# Patient Record
Sex: FEMALE | Race: BLACK OR AFRICAN AMERICAN | NOT HISPANIC OR LATINO
[De-identification: names, ages, dates, MRNs, and addresses within clinical notes are randomized per-mention and may not be internally consistent; named-entity substitution may affect disease eponyms.]

---

## 2019-02-09 ENCOUNTER — TRANSCRIPTION ENCOUNTER (OUTPATIENT)
Age: 54
End: 2019-02-09

## 2019-11-27 ENCOUNTER — APPOINTMENT (OUTPATIENT)
Dept: SURGICAL ONCOLOGY | Facility: CLINIC | Age: 54
End: 2019-11-27
Payer: COMMERCIAL

## 2019-11-27 VITALS
BODY MASS INDEX: 25.43 KG/M2 | DIASTOLIC BLOOD PRESSURE: 81 MMHG | OXYGEN SATURATION: 100 % | SYSTOLIC BLOOD PRESSURE: 128 MMHG | WEIGHT: 162 LBS | HEIGHT: 67 IN | HEART RATE: 65 BPM

## 2019-11-27 DIAGNOSIS — R92.8 OTHER ABNORMAL AND INCONCLUSIVE FINDINGS ON DIAGNOSTIC IMAGING OF BREAST: ICD-10-CM

## 2019-11-27 PROCEDURE — 36415 COLL VENOUS BLD VENIPUNCTURE: CPT

## 2019-11-27 PROCEDURE — 99245 OFF/OP CONSLTJ NEW/EST HI 55: CPT

## 2019-11-27 NOTE — PHYSICAL EXAM
[Normal] : supple, no neck mass and thyroid not enlarged [Normal Supraclavicular Lymph Nodes] : normal supraclavicular lymph nodes [Normal Axillary Lymph Nodes] : normal axillary lymph nodes [Normal] : oriented to person, place and time, with appropriate affect [de-identified] : No dominant mass or nipple discharge bilaterally.  [FreeTextEntry1] : Deferred

## 2019-11-27 NOTE — HISTORY OF PRESENT ILLNESS
[de-identified] : Ms. Medina is a 54 year old female who presents today for an initial consultation.  She  was referred by Dr. Tracy Castro\par \par Rosanna has been experiencing some occasional right breast tenderness and prompting her mammo Oct 2019.  She was recalled for targeted imaging for a distortion in the right breast 11:00 however no sonographic correlate was noted (Birads 4).  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  She denies palpable breast masses, nipple discharge, skin changes, inversion. Denies constitutional symptoms.  \par  \par Family history of breast cancer in her neice in her 30s who is also BRCA +.  Denies family history of ovarian cancer.\par \par Menarche: 12 y.o.\par \par Age at first pregnancy: 17 y.o.\par \par Internist: Tracy Castro

## 2019-11-27 NOTE — ASSESSMENT
[FreeTextEntry1] : Impression:\par Newly diagnosed Right breast DCIS/LCIS/radial scar 11:00 \par Tumor markers pending\par +FH in niece who is BRCA+\par \par Plan:\par MRI breast now to evaluate for possible multi-focality\par Right breast jeffery- localized lumpectomy\par Genetic testing today.  \par

## 2019-11-27 NOTE — CONSULT LETTER
[Dear  ___] : Dear  [unfilled], [Consult Letter:] : I had the pleasure of evaluating your patient, [unfilled]. [Please see my note below.] : Please see my note below. [Consult Closing:] : Thank you very much for allowing me to participate in the care of this patient.  If you have any questions, please do not hesitate to contact me. [Sincerely,] : Sincerely, [FreeTextEntry1] : I will keep you informed of the MRI and BRCA results and my subsequent plans. [FreeTextEntry3] : Zach Valencia MD FACS\par Chief of Surgical Oncology\par \par

## 2019-12-12 ENCOUNTER — FORM ENCOUNTER (OUTPATIENT)
Age: 54
End: 2019-12-12

## 2019-12-13 ENCOUNTER — APPOINTMENT (OUTPATIENT)
Dept: MRI IMAGING | Facility: IMAGING CENTER | Age: 54
End: 2019-12-13
Payer: COMMERCIAL

## 2019-12-13 ENCOUNTER — OUTPATIENT (OUTPATIENT)
Dept: OUTPATIENT SERVICES | Facility: HOSPITAL | Age: 54
LOS: 1 days | End: 2019-12-13
Payer: COMMERCIAL

## 2019-12-13 DIAGNOSIS — D05.10 INTRADUCTAL CARCINOMA IN SITU OF UNSPECIFIED BREAST: ICD-10-CM

## 2019-12-13 PROCEDURE — C8908: CPT

## 2019-12-13 PROCEDURE — 77049 MRI BREAST C-+ W/CAD BI: CPT | Mod: 26

## 2019-12-13 PROCEDURE — C8937: CPT

## 2019-12-13 PROCEDURE — A9585: CPT

## 2019-12-16 ENCOUNTER — CHART COPY (OUTPATIENT)
Age: 54
End: 2019-12-16

## 2019-12-22 ENCOUNTER — FORM ENCOUNTER (OUTPATIENT)
Age: 54
End: 2019-12-22

## 2019-12-23 ENCOUNTER — APPOINTMENT (OUTPATIENT)
Dept: MRI IMAGING | Facility: IMAGING CENTER | Age: 54
End: 2019-12-23
Payer: COMMERCIAL

## 2019-12-23 ENCOUNTER — RESULT REVIEW (OUTPATIENT)
Age: 54
End: 2019-12-23

## 2019-12-23 ENCOUNTER — OUTPATIENT (OUTPATIENT)
Dept: OUTPATIENT SERVICES | Facility: HOSPITAL | Age: 54
LOS: 1 days | End: 2019-12-23
Payer: COMMERCIAL

## 2019-12-23 DIAGNOSIS — D05.10 INTRADUCTAL CARCINOMA IN SITU OF UNSPECIFIED BREAST: ICD-10-CM

## 2019-12-23 PROCEDURE — A9585: CPT

## 2019-12-23 PROCEDURE — 77065 DX MAMMO INCL CAD UNI: CPT

## 2019-12-23 PROCEDURE — 88305 TISSUE EXAM BY PATHOLOGIST: CPT | Mod: 26

## 2019-12-23 PROCEDURE — 19086 BX BREAST ADD LESION MR IMAG: CPT

## 2019-12-23 PROCEDURE — 19085 BX BREAST 1ST LESION MR IMAG: CPT

## 2019-12-23 PROCEDURE — 77065 DX MAMMO INCL CAD UNI: CPT | Mod: 26,RT

## 2019-12-23 PROCEDURE — 19086 BX BREAST ADD LESION MR IMAG: CPT | Mod: RT

## 2019-12-23 PROCEDURE — 88305 TISSUE EXAM BY PATHOLOGIST: CPT

## 2019-12-23 PROCEDURE — 19085 BX BREAST 1ST LESION MR IMAG: CPT | Mod: RT

## 2019-12-30 ENCOUNTER — CHART COPY (OUTPATIENT)
Age: 54
End: 2019-12-30

## 2019-12-30 ENCOUNTER — RESULT REVIEW (OUTPATIENT)
Age: 54
End: 2019-12-30

## 2019-12-30 ENCOUNTER — OUTPATIENT (OUTPATIENT)
Dept: OUTPATIENT SERVICES | Facility: HOSPITAL | Age: 54
LOS: 1 days | End: 2019-12-30
Payer: COMMERCIAL

## 2019-12-30 DIAGNOSIS — C50.919 MALIGNANT NEOPLASM OF UNSPECIFIED SITE OF UNSPECIFIED FEMALE BREAST: ICD-10-CM

## 2019-12-30 PROCEDURE — 88321 CONSLTJ&REPRT SLD PREP ELSWR: CPT

## 2020-01-05 PROBLEM — Z13.71 BRCA NEGATIVE: Status: ACTIVE | Noted: 2020-01-05

## 2020-01-06 ENCOUNTER — APPOINTMENT (OUTPATIENT)
Dept: SURGICAL ONCOLOGY | Facility: CLINIC | Age: 55
End: 2020-01-06
Payer: COMMERCIAL

## 2020-01-06 VITALS
OXYGEN SATURATION: 99 % | HEART RATE: 64 BPM | RESPIRATION RATE: 16 BRPM | SYSTOLIC BLOOD PRESSURE: 137 MMHG | HEIGHT: 67 IN | WEIGHT: 162 LBS | DIASTOLIC BLOOD PRESSURE: 89 MMHG | BODY MASS INDEX: 25.43 KG/M2

## 2020-01-06 DIAGNOSIS — Z13.71 ENCOUNTER FOR NONPROCREATIVE SCREENING FOR GENETIC DISEASE CARRIER STATUS: ICD-10-CM

## 2020-01-06 PROCEDURE — 99214 OFFICE O/P EST MOD 30 MIN: CPT

## 2020-01-06 NOTE — CONSULT LETTER
[Dear  ___] : Dear  [unfilled], [Courtesy Letter:] : I had the pleasure of seeing your patient, [unfilled], in my office today. [Please see my note below.] : Please see my note below. [Consult Closing:] : Thank you very much for allowing me to participate in the care of this patient.  If you have any questions, please do not hesitate to contact me. [Sincerely,] : Sincerely, [FreeTextEntry1] : I will keep you informed of the final pathology of the surgery. [FreeTextEntry3] : Zach Valencia MD FACS\par Chief of Surgical Oncology\par \par

## 2020-01-06 NOTE — ASSESSMENT
[FreeTextEntry1] : Impression:\par Newly diagnosed Right breast DCIS/LCIS/radial scar 11:00  and right breast IDP\par Tumor markers pending\par +FH in niece who is BRCA+\par Patient is BRCA Negative\par \par Plan:\par 2 site Right breast jeffery- localized lumpectomies \par - small barrel clip - DCIS\par -hourglass clip anteriorly - papilloma\par \par

## 2020-01-06 NOTE — PHYSICAL EXAM
[Normal] : supple, no neck mass and thyroid not enlarged [Normal Supraclavicular Lymph Nodes] : normal supraclavicular lymph nodes [Normal Axillary Lymph Nodes] : normal axillary lymph nodes [Normal] : oriented to person, place and time, with appropriate affect [de-identified] : No dominant mass or nipple discharge bilaterally. ; resolving right breast hematoma/ecchymoses  [FreeTextEntry1] : Deferred

## 2020-01-06 NOTE — HISTORY OF PRESENT ILLNESS
[de-identified] : Ms. Medina is a 54 year old female who presents today for a follow up visit. \par Initially consulted 19, referred by Dr. Tracy Castro\par \par Rosanna has been experiencing some occasional right breast tenderness and prompting her mammo Oct 2019.  She was recalled for targeted imaging for a distortion in the right breast 11:00 however no sonographic correlate was noted (Birads 4).  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  She denies palpable breast masses, nipple discharge, skin changes, inversion. Denies constitutional symptoms.  \par  \par Family history of breast cancer in her niece in her 30s who is also BRCA +.  Denies family history of ovarian cancer.\par \par Menarche: 12 y.o.\par \par Age at first pregnancy: 17 y.o.\par \par MRI Breast 19- IMPRESSION:\par 1.  Biopsy-proven ductal carcinoma in situ in the upper central right breast, as above. Additional 0.5 cm focus approximately 1 cm medial and slightly superior to the index lesion, suspicious for additional disease. Wide excision at this location is recommended to include this additional focus of enhancement.\par 2.  Nonmass enhancement in the upper outer posterior right breast measuring up to 1.2 cm. MR guided biopsy is recommended.\par 3.  Two adjacent foci in the slightly upper outer right breast. MR guided biopsy of the larger 0.4 cm foci is recommended.\par 4.  Scattered enhancing nonspecific foci in the left breast without suspicious enhancement. Breast MRI is recommended in 6 months ascertain stability.\par 5.  Tiny enhancing skin lesions bilaterally, as above. In addition, questionable skin thickening involving the slightly lower outer \par retroareolar right breast. Direct inspection/dermatologic evaluation is recommended.\par RECOMMENDATION:  MR guided biopsy;  BI-RADS 4B\par \par She is s/p 2 site MRI guided biopsies on 19 with the following pathology: \par 1) Right upper outer posterior MRI bx:  Nonproliferative fibrocystic change\par 2) Right upper outer MRI bx:  Intraductal papilloma with proliferative change \par \par The patient also underwent genetic testing and is BRCA Negative.  Resolving right breast hematoma. \par \par \par Internist: Tracy Castro

## 2020-01-06 NOTE — REASON FOR VISIT
[Follow-Up Visit] : a follow-up visit for [Breast Cancer] : breast cancer [FreeTextEntry2] : and right breast IDP

## 2020-01-12 ENCOUNTER — FORM ENCOUNTER (OUTPATIENT)
Age: 55
End: 2020-01-12

## 2020-01-13 ENCOUNTER — OUTPATIENT (OUTPATIENT)
Dept: OUTPATIENT SERVICES | Facility: HOSPITAL | Age: 55
LOS: 1 days | End: 2020-01-13
Payer: COMMERCIAL

## 2020-01-13 ENCOUNTER — APPOINTMENT (OUTPATIENT)
Dept: MAMMOGRAPHY | Facility: IMAGING CENTER | Age: 55
End: 2020-01-13
Payer: COMMERCIAL

## 2020-01-13 VITALS
SYSTOLIC BLOOD PRESSURE: 123 MMHG | OXYGEN SATURATION: 99 % | RESPIRATION RATE: 18 BRPM | TEMPERATURE: 98 F | HEIGHT: 67 IN | WEIGHT: 156.97 LBS | DIASTOLIC BLOOD PRESSURE: 82 MMHG | HEART RATE: 83 BPM

## 2020-01-13 DIAGNOSIS — Z90.711 ACQUIRED ABSENCE OF UTERUS WITH REMAINING CERVICAL STUMP: Chronic | ICD-10-CM

## 2020-01-13 DIAGNOSIS — D05.10 INTRADUCTAL CARCINOMA IN SITU OF UNSPECIFIED BREAST: ICD-10-CM

## 2020-01-13 DIAGNOSIS — D24.1 BENIGN NEOPLASM OF RIGHT BREAST: ICD-10-CM

## 2020-01-13 DIAGNOSIS — Z01.818 ENCOUNTER FOR OTHER PREPROCEDURAL EXAMINATION: ICD-10-CM

## 2020-01-13 DIAGNOSIS — Z00.8 ENCOUNTER FOR OTHER GENERAL EXAMINATION: ICD-10-CM

## 2020-01-13 LAB
ANION GAP SERPL CALC-SCNC: 8 MMOL/L — SIGNIFICANT CHANGE UP (ref 5–17)
BUN SERPL-MCNC: 11 MG/DL — SIGNIFICANT CHANGE UP (ref 7–23)
CALCIUM SERPL-MCNC: 9.4 MG/DL — SIGNIFICANT CHANGE UP (ref 8.4–10.5)
CHLORIDE SERPL-SCNC: 101 MMOL/L — SIGNIFICANT CHANGE UP (ref 96–108)
CO2 SERPL-SCNC: 29 MMOL/L — SIGNIFICANT CHANGE UP (ref 22–31)
CREAT SERPL-MCNC: 0.75 MG/DL — SIGNIFICANT CHANGE UP (ref 0.5–1.3)
GLUCOSE SERPL-MCNC: 92 MG/DL — SIGNIFICANT CHANGE UP (ref 70–99)
HCT VFR BLD CALC: 41.7 % — SIGNIFICANT CHANGE UP (ref 34.5–45)
HGB BLD-MCNC: 13.8 G/DL — SIGNIFICANT CHANGE UP (ref 11.5–15.5)
MCHC RBC-ENTMCNC: 31 PG — SIGNIFICANT CHANGE UP (ref 27–34)
MCHC RBC-ENTMCNC: 33.1 GM/DL — SIGNIFICANT CHANGE UP (ref 32–36)
MCV RBC AUTO: 93.7 FL — SIGNIFICANT CHANGE UP (ref 80–100)
PLATELET # BLD AUTO: 284 K/UL — SIGNIFICANT CHANGE UP (ref 150–400)
POTASSIUM SERPL-MCNC: 3.8 MMOL/L — SIGNIFICANT CHANGE UP (ref 3.5–5.3)
POTASSIUM SERPL-SCNC: 3.8 MMOL/L — SIGNIFICANT CHANGE UP (ref 3.5–5.3)
RBC # BLD: 4.45 M/UL — SIGNIFICANT CHANGE UP (ref 3.8–5.2)
RBC # FLD: 12.3 % — SIGNIFICANT CHANGE UP (ref 10.3–14.5)
SODIUM SERPL-SCNC: 138 MMOL/L — SIGNIFICANT CHANGE UP (ref 135–145)
WBC # BLD: 6.98 K/UL — SIGNIFICANT CHANGE UP (ref 3.8–10.5)
WBC # FLD AUTO: 6.98 K/UL — SIGNIFICANT CHANGE UP (ref 3.8–10.5)

## 2020-01-13 PROCEDURE — 19282 PERQ DEVICE BREAST EA IMAG: CPT | Mod: RT

## 2020-01-13 PROCEDURE — 19281 PERQ DEVICE BREAST 1ST IMAG: CPT

## 2020-01-13 PROCEDURE — 19281 PERQ DEVICE BREAST 1ST IMAG: CPT | Mod: RT

## 2020-01-13 PROCEDURE — 85027 COMPLETE CBC AUTOMATED: CPT

## 2020-01-13 PROCEDURE — G0463: CPT

## 2020-01-13 PROCEDURE — 19282 PERQ DEVICE BREAST EA IMAG: CPT

## 2020-01-13 PROCEDURE — C1739: CPT

## 2020-01-13 PROCEDURE — 80048 BASIC METABOLIC PNL TOTAL CA: CPT

## 2020-01-13 RX ORDER — LIDOCAINE HCL 20 MG/ML
0.2 VIAL (ML) INJECTION ONCE
Refills: 0 | Status: DISCONTINUED | OUTPATIENT
Start: 2020-01-15 | End: 2020-02-03

## 2020-01-13 RX ORDER — SODIUM CHLORIDE 9 MG/ML
3 INJECTION INTRAMUSCULAR; INTRAVENOUS; SUBCUTANEOUS EVERY 8 HOURS
Refills: 0 | Status: DISCONTINUED | OUTPATIENT
Start: 2020-01-15 | End: 2020-02-03

## 2020-01-13 RX ORDER — CHLORHEXIDINE GLUCONATE 213 G/1000ML
1 SOLUTION TOPICAL DAILY
Refills: 0 | Status: DISCONTINUED | OUTPATIENT
Start: 2020-01-15 | End: 2020-02-03

## 2020-01-13 NOTE — H&P PST ADULT - HISTORY OF PRESENT ILLNESS
Pt is a 54y.o. BF who presents s/p routine mammography in Oct. 2019 that revealed a suspicious lesion in her right breast. Followup MRI and biopsy revealed a second nonpalpable lesion that was diagnosed as DCIS. Pt is scheduled for right breast lumptectomy x 2 sites with Dr. Valencia on 1/15/2020.    Fernanda  placed 1/13/2020 in 2 areas.

## 2020-01-13 NOTE — H&P PST ADULT - NSANTHOSAYNRD_GEN_A_CORE
No. PHYLLIS screening performed.  STOP BANG Legend: 0-2 = LOW Risk; 3-4 = INTERMEDIATE Risk; 5-8 = HIGH Risk

## 2020-01-13 NOTE — H&P PST ADULT - NSICDXPASTMEDICALHX_GEN_ALL_CORE_FT
PAST MEDICAL HISTORY:  DCIS (ductal carcinoma in situ) of breast Right    Endometriosis     Fibroids

## 2020-01-13 NOTE — H&P PST ADULT - NSICDXPROBLEM_GEN_ALL_CORE_FT
PROBLEM DIAGNOSES  Problem: DCIS (ductal carcinoma in situ)  Assessment and Plan: Pt is scheduled for a right breast lumpectomy x 2 s/p jeffery  localization  NPO after 11PM

## 2020-01-14 ENCOUNTER — FORM ENCOUNTER (OUTPATIENT)
Age: 55
End: 2020-01-14

## 2020-01-15 ENCOUNTER — OUTPATIENT (OUTPATIENT)
Dept: OUTPATIENT SERVICES | Facility: HOSPITAL | Age: 55
LOS: 1 days | End: 2020-01-15
Payer: COMMERCIAL

## 2020-01-15 ENCOUNTER — RESULT REVIEW (OUTPATIENT)
Age: 55
End: 2020-01-15

## 2020-01-15 ENCOUNTER — APPOINTMENT (OUTPATIENT)
Dept: SURGICAL ONCOLOGY | Facility: HOSPITAL | Age: 55
End: 2020-01-15

## 2020-01-15 VITALS
DIASTOLIC BLOOD PRESSURE: 59 MMHG | TEMPERATURE: 97 F | RESPIRATION RATE: 14 BRPM | HEART RATE: 67 BPM | SYSTOLIC BLOOD PRESSURE: 102 MMHG | OXYGEN SATURATION: 99 %

## 2020-01-15 VITALS
WEIGHT: 156.97 LBS | TEMPERATURE: 97 F | HEIGHT: 67 IN | SYSTOLIC BLOOD PRESSURE: 116 MMHG | OXYGEN SATURATION: 100 % | RESPIRATION RATE: 18 BRPM | HEART RATE: 61 BPM | DIASTOLIC BLOOD PRESSURE: 75 MMHG

## 2020-01-15 DIAGNOSIS — Z90.711 ACQUIRED ABSENCE OF UTERUS WITH REMAINING CERVICAL STUMP: Chronic | ICD-10-CM

## 2020-01-15 DIAGNOSIS — D24.1 BENIGN NEOPLASM OF RIGHT BREAST: ICD-10-CM

## 2020-01-15 DIAGNOSIS — D05.10 INTRADUCTAL CARCINOMA IN SITU OF UNSPECIFIED BREAST: ICD-10-CM

## 2020-01-15 PROCEDURE — 19125 EXCISION BREAST LESION: CPT

## 2020-01-15 PROCEDURE — 76098 X-RAY EXAM SURGICAL SPECIMEN: CPT | Mod: 26

## 2020-01-15 PROCEDURE — 19301 PARTIAL MASTECTOMY: CPT | Mod: RT

## 2020-01-15 PROCEDURE — 88307 TISSUE EXAM BY PATHOLOGIST: CPT

## 2020-01-15 PROCEDURE — 88305 TISSUE EXAM BY PATHOLOGIST: CPT

## 2020-01-15 PROCEDURE — 88307 TISSUE EXAM BY PATHOLOGIST: CPT | Mod: 26

## 2020-01-15 PROCEDURE — 88305 TISSUE EXAM BY PATHOLOGIST: CPT | Mod: 26

## 2020-01-15 PROCEDURE — 76098 X-RAY EXAM SURGICAL SPECIMEN: CPT

## 2020-01-15 PROCEDURE — 19366: CPT | Mod: 59

## 2020-01-15 RX ORDER — CELECOXIB 200 MG/1
200 CAPSULE ORAL ONCE
Refills: 0 | Status: DISCONTINUED | OUTPATIENT
Start: 2020-01-15 | End: 2020-02-03

## 2020-01-15 RX ORDER — ONDANSETRON 8 MG/1
4 TABLET, FILM COATED ORAL ONCE
Refills: 0 | Status: DISCONTINUED | OUTPATIENT
Start: 2020-01-15 | End: 2020-02-03

## 2020-01-15 RX ORDER — OXYCODONE HYDROCHLORIDE 5 MG/1
5 TABLET ORAL ONCE
Refills: 0 | Status: DISCONTINUED | OUTPATIENT
Start: 2020-01-15 | End: 2020-01-15

## 2020-01-15 RX ORDER — SODIUM CHLORIDE 9 MG/ML
1000 INJECTION, SOLUTION INTRAVENOUS
Refills: 0 | Status: DISCONTINUED | OUTPATIENT
Start: 2020-01-15 | End: 2020-02-03

## 2020-01-15 RX ORDER — MILK THISTLE 150 MG
1 CAPSULE ORAL
Qty: 0 | Refills: 0 | DISCHARGE

## 2020-01-15 RX ORDER — ACETAMINOPHEN 500 MG
1000 TABLET ORAL ONCE
Refills: 0 | Status: COMPLETED | OUTPATIENT
Start: 2020-01-15 | End: 2020-01-15

## 2020-01-15 RX ORDER — CELECOXIB 200 MG/1
200 CAPSULE ORAL ONCE
Refills: 0 | Status: COMPLETED | OUTPATIENT
Start: 2020-01-15 | End: 2020-01-15

## 2020-01-15 RX ADMIN — CELECOXIB 200 MILLIGRAM(S): 200 CAPSULE ORAL at 09:33

## 2020-01-15 RX ADMIN — CHLORHEXIDINE GLUCONATE 1 APPLICATION(S): 213 SOLUTION TOPICAL at 09:37

## 2020-01-15 RX ADMIN — Medication 1000 MILLIGRAM(S): at 09:32

## 2020-01-15 NOTE — BRIEF OPERATIVE NOTE - OPERATION/FINDINGS
Right breast lumpectomy with QAMAR  localization. 2 sites with QAMAR  x2 and clips x2 excised as one lumpectomy specimen. Confirmed with intraop radiology. Superior, medial, inferior, lateral and deep margins of right breast excised. Hemostasis achieved. Closed in layers.

## 2020-01-15 NOTE — ASU DISCHARGE PLAN (ADULT/PEDIATRIC) - CARE PROVIDER_API CALL
Zach Valencia)  Surgery  [address] [place] [address]  Phone: [phone]  Fax: [phone]  Follow Up Time: 2 weeks
- - -

## 2020-01-15 NOTE — ASU DISCHARGE PLAN (ADULT/PEDIATRIC) - ASU DC SPECIAL INSTRUCTIONSFT
WOUND CARE: Keep incision clean and dry.  Cover with dry, sterile dressing as needed. You may remove outer dressings in 24 hours. Leave steri strips (small white strips) that are underneath outer dressing in tact. They will fall off over time on their own. You may wear supportive bra to provide compression.   BATHING: Please do not submerge wound underwater. You may shower and/or sponge bathe starting 24 hours after discharge. Pat incision dry. Do not scrub.   ACTIVITY: No heavy lifting or straining. Otherwise, you may return to your usual level of physical activity.  PAIN MEDICATION: You may take over the counter Tylenol and/or Ibuprofen every 6 hours as needed for pain. You may ice area. You may use sports bra/supportive bra to apply compression.   DIET: Resume home diet.  NOTIFY YOUR SURGEON IF: You have any bleeding that does not stop, any pus draining from your wound, any fever (over 100.4 F) or chills, or if your pain is not controlled or worsens.  FOLLOW-UP: Please call and schedule a follow up appointment 2 weeks after discharge with Dr. Valencia.  Please call the office for appointment at [phone].

## 2020-01-16 PROBLEM — N80.9 ENDOMETRIOSIS, UNSPECIFIED: Chronic | Status: ACTIVE | Noted: 2020-01-13

## 2020-01-16 PROBLEM — D21.9 BENIGN NEOPLASM OF CONNECTIVE AND OTHER SOFT TISSUE, UNSPECIFIED: Chronic | Status: ACTIVE | Noted: 2020-01-13

## 2020-01-16 PROBLEM — D05.10 INTRADUCTAL CARCINOMA IN SITU OF UNSPECIFIED BREAST: Chronic | Status: ACTIVE | Noted: 2020-01-13

## 2020-01-27 ENCOUNTER — APPOINTMENT (OUTPATIENT)
Dept: SURGICAL ONCOLOGY | Facility: CLINIC | Age: 55
End: 2020-01-27
Payer: COMMERCIAL

## 2020-01-27 VITALS
WEIGHT: 160 LBS | DIASTOLIC BLOOD PRESSURE: 86 MMHG | BODY MASS INDEX: 25.11 KG/M2 | HEIGHT: 67 IN | OXYGEN SATURATION: 100 % | HEART RATE: 69 BPM | SYSTOLIC BLOOD PRESSURE: 150 MMHG | RESPIRATION RATE: 16 BRPM

## 2020-01-27 PROCEDURE — 99024 POSTOP FOLLOW-UP VISIT: CPT

## 2020-01-27 PROCEDURE — 99214 OFFICE O/P EST MOD 30 MIN: CPT | Mod: 24

## 2020-01-27 NOTE — HISTORY OF PRESENT ILLNESS
[de-identified] : S/P right breast lumpectomy for a small area of DCIS and a papilloma\par Final pathology: no residual DCIS with negative margins

## 2020-01-27 NOTE — ASSESSMENT
[FreeTextEntry1] : IMP:\par Small  area of DCIS - completely excised\par \par Plan:\par No need for adjuvant therapy - this was discussed with the patient and her \par RTO 6 months with right mammogram - new baseline\par Then RTO q 6 months\par yearly bilateral mammograms starting in January, 2021

## 2020-01-27 NOTE — CONSULT LETTER
[Dear  ___] : Dear  [unfilled], [Courtesy Letter:] : I had the pleasure of seeing your patient, [unfilled], in my office today. [Please see my note below.] : Please see my note below. [Consult Closing:] : Thank you very much for allowing me to participate in the care of this patient.  If you have any questions, please do not hesitate to contact me. [Sincerely,] : Sincerely, [FreeTextEntry1] : I will keep you informed of my follow-up. [FreeTextEntry3] : Zach Valencia MD FACS\par Chief of Surgical Oncology\par \par

## 2020-07-02 ENCOUNTER — RESULT REVIEW (OUTPATIENT)
Age: 55
End: 2020-07-02

## 2020-07-02 ENCOUNTER — APPOINTMENT (OUTPATIENT)
Dept: MAMMOGRAPHY | Facility: IMAGING CENTER | Age: 55
End: 2020-07-02
Payer: COMMERCIAL

## 2020-07-02 ENCOUNTER — OUTPATIENT (OUTPATIENT)
Dept: OUTPATIENT SERVICES | Facility: HOSPITAL | Age: 55
LOS: 1 days | End: 2020-07-02
Payer: COMMERCIAL

## 2020-07-02 DIAGNOSIS — D05.10 INTRADUCTAL CARCINOMA IN SITU OF UNSPECIFIED BREAST: ICD-10-CM

## 2020-07-02 DIAGNOSIS — Z90.711 ACQUIRED ABSENCE OF UTERUS WITH REMAINING CERVICAL STUMP: Chronic | ICD-10-CM

## 2020-07-02 PROCEDURE — G0279: CPT

## 2020-07-02 PROCEDURE — G0279: CPT | Mod: 26

## 2020-07-02 PROCEDURE — 77065 DX MAMMO INCL CAD UNI: CPT

## 2020-07-02 PROCEDURE — 77065 DX MAMMO INCL CAD UNI: CPT | Mod: 26,RT

## 2020-07-09 ENCOUNTER — APPOINTMENT (OUTPATIENT)
Dept: SURGICAL ONCOLOGY | Facility: CLINIC | Age: 55
End: 2020-07-09
Payer: COMMERCIAL

## 2020-07-09 VITALS
OXYGEN SATURATION: 99 % | HEIGHT: 67 IN | DIASTOLIC BLOOD PRESSURE: 76 MMHG | BODY MASS INDEX: 25.43 KG/M2 | WEIGHT: 162 LBS | HEART RATE: 70 BPM | SYSTOLIC BLOOD PRESSURE: 142 MMHG

## 2020-07-09 VITALS — TEMPERATURE: 97.9 F

## 2020-07-09 PROCEDURE — 99214 OFFICE O/P EST MOD 30 MIN: CPT

## 2020-07-09 NOTE — HISTORY OF PRESENT ILLNESS
[de-identified] : Right DCIS follow-up\par No masses or nipple discharge\par right mammogram 7/20 : -

## 2020-07-09 NOTE — PHYSICAL EXAM
[Normal] : supple, no neck mass and thyroid not enlarged [Normal Neck Lymph Nodes] : normal neck lymph nodes  [Normal Supraclavicular Lymph Nodes] : normal supraclavicular lymph nodes [Normal Axillary Lymph Nodes] : normal axillary lymph nodes [de-identified] : Fibrocystic but no masses [Normal] : oriented to person, place and time, with appropriate affect

## 2021-03-25 ENCOUNTER — APPOINTMENT (OUTPATIENT)
Dept: SURGICAL ONCOLOGY | Facility: CLINIC | Age: 56
End: 2021-03-25
Payer: COMMERCIAL

## 2021-03-25 VITALS
BODY MASS INDEX: 25.58 KG/M2 | DIASTOLIC BLOOD PRESSURE: 82 MMHG | WEIGHT: 163 LBS | SYSTOLIC BLOOD PRESSURE: 140 MMHG | HEIGHT: 67 IN | HEART RATE: 73 BPM | OXYGEN SATURATION: 100 %

## 2021-03-25 PROCEDURE — 99072 ADDL SUPL MATRL&STAF TM PHE: CPT

## 2021-03-25 PROCEDURE — 99214 OFFICE O/P EST MOD 30 MIN: CPT

## 2021-03-25 NOTE — ASSESSMENT
[FreeTextEntry1] : IMP: \par DCIS -MONICA\par \par Plan:\par RTO q 6 months\par Bilateral mammogram/sono 12/21

## 2021-03-25 NOTE — PHYSICAL EXAM
[Normal] : supple, no neck mass and thyroid not enlarged [Normal Neck Lymph Nodes] : normal neck lymph nodes  [Normal Supraclavicular Lymph Nodes] : normal supraclavicular lymph nodes [Normal Axillary Lymph Nodes] : normal axillary lymph nodes [Normal] : oriented to person, place and time, with appropriate affect [de-identified] : right breast scar but no masses

## 2021-03-25 NOTE — HISTORY OF PRESENT ILLNESS
[de-identified] : 55 year-old female returns for follow up.  She is status post right breast QAMAR  lumpectomy x 2 sites on 1/15/20 for intraductal papilloma and  DCIS.  Pathology demonstrated no residual DCIS with focal LCIS, negative margins.  She completed a right breast mammogram in July 2020 with benign findings (BIRADS 2). \par \par Bilateral Mammo 12/16/20: Postoperative and postradiation changes involving the right breast. This examination is incomplete without the performance of a right breast ultrasound given the clinical history of right upper outer breast pain. Ultrasound is also advised given the recent history of right breast DCIS, LCIS, and radial scar. The patient should return as soon as possible for a right breast ultrasound. The patient is advised to retrieve her prior outside imaging studies performed in July 2020 and submit them for comparison. BI-RADS 0. \par \par Right Sono 12/16/20: No ultrasonographic evidence of malignancy. Return to screening mammography and sonography in one year. Any further evaluation of the patient's symptomatology should be made based on clinical grounds, BI-RADS 2. \par \par Today, she states she has intermittent right breast pain. She denies constitutional symptoms otherwise. She is currently going through menopause.

## 2021-10-07 ENCOUNTER — APPOINTMENT (OUTPATIENT)
Dept: SURGICAL ONCOLOGY | Facility: CLINIC | Age: 56
End: 2021-10-07
Payer: COMMERCIAL

## 2021-10-07 VITALS
HEIGHT: 67 IN | WEIGHT: 170 LBS | OXYGEN SATURATION: 100 % | BODY MASS INDEX: 26.68 KG/M2 | RESPIRATION RATE: 15 BRPM | HEART RATE: 65 BPM | DIASTOLIC BLOOD PRESSURE: 82 MMHG | SYSTOLIC BLOOD PRESSURE: 136 MMHG

## 2021-10-07 PROCEDURE — 99214 OFFICE O/P EST MOD 30 MIN: CPT

## 2021-10-07 NOTE — PHYSICAL EXAM
[Normal] : supple, no neck mass and thyroid not enlarged [Normal Neck Lymph Nodes] : normal neck lymph nodes  [Normal Supraclavicular Lymph Nodes] : normal supraclavicular lymph nodes [Normal Axillary Lymph Nodes] : normal axillary lymph nodes [Normal] : oriented to person, place and time, with appropriate affect [de-identified] : mild fibrocystic changes but no masses

## 2021-10-07 NOTE — HISTORY OF PRESENT ILLNESS
[de-identified] : Rosanna Medina is a 56 year-old female returns for follow up.  She is status post right breast QAMAR  lumpectomy x 2 sites on 1/15/20 for intraductal papilloma and  DCIS.  Pathology demonstrated no residual DCIS with focal LCIS, negative margins.  She completed a right breast mammogram in July 2020 with benign findings (BIRADS 2). \par \par Bilateral Mammo 12/16/20: Postoperative and postradiation changes involving the right breast. This examination is incomplete without the performance of a right breast ultrasound given the clinical history of right upper outer breast pain. Ultrasound is also advised given the recent history of right breast DCIS, LCIS, and radial scar. The patient should return as soon as possible for a right breast ultrasound. The patient is advised to retrieve her prior outside imaging studies performed in July 2020 and submit them for comparison. BI-RADS 0. \par \par Right Sono 12/16/20: No ultrasonographic evidence of malignancy. Return to screening mammography and sonography in one year. Any further evaluation of the patient's symptomatology should be made based on clinical grounds, BI-RADS 2. \par \par Today, she states she has intermittent right breast pain. She denies constitutional symptoms otherwise. She is currently going through menopause.

## 2021-10-07 NOTE — ASSESSMENT
[FreeTextEntry1] : IMP: \par DCIS -MONICA\par Mammo/sono BIRADS 2 12/20\par \par Plan:\par RTO q 6 months\par Bilateral mammogram/sono 12/21

## 2021-10-11 ENCOUNTER — APPOINTMENT (OUTPATIENT)
Dept: DERMATOLOGY | Facility: CLINIC | Age: 56
End: 2021-10-11

## 2022-01-12 ENCOUNTER — NON-APPOINTMENT (OUTPATIENT)
Age: 57
End: 2022-01-12

## 2022-01-12 DIAGNOSIS — Z01.812 ENCOUNTER FOR PREPROCEDURAL LABORATORY EXAMINATION: ICD-10-CM

## 2022-01-12 DIAGNOSIS — K83.9 DISEASE OF BILIARY TRACT, UNSPECIFIED: ICD-10-CM

## 2022-01-12 DIAGNOSIS — R93.5 ABNORMAL FINDINGS ON DIAGNOSTIC IMAGING OF OTHER ABDOMINAL REGIONS, INCLUDING RETROPERITONEUM: ICD-10-CM

## 2022-02-04 NOTE — ASU PATIENT PROFILE, ADULT - FALL HARM RISK - UNIVERSAL INTERVENTIONS
Bed in lowest position, wheels locked, appropriate side rails in place/Call bell, personal items and telephone in reach/Instruct patient to call for assistance before getting out of bed or chair/Non-slip footwear when patient is out of bed/Odem to call system/Physically safe environment - no spills, clutter or unnecessary equipment/Purposeful Proactive Rounding/Room/bathroom lighting operational, light cord in reach

## 2022-02-06 RX ORDER — SODIUM CHLORIDE 9 MG/ML
500 INJECTION, SOLUTION INTRAVENOUS
Refills: 0 | Status: DISCONTINUED | OUTPATIENT
Start: 2022-02-07 | End: 2022-02-21

## 2022-02-07 ENCOUNTER — APPOINTMENT (OUTPATIENT)
Dept: GASTROENTEROLOGY | Facility: HOSPITAL | Age: 57
End: 2022-02-07

## 2022-02-07 ENCOUNTER — OUTPATIENT (OUTPATIENT)
Dept: OUTPATIENT SERVICES | Facility: HOSPITAL | Age: 57
LOS: 1 days | Discharge: HOME | End: 2022-02-07
Payer: COMMERCIAL

## 2022-02-07 ENCOUNTER — RESULT REVIEW (OUTPATIENT)
Age: 57
End: 2022-02-07

## 2022-02-07 VITALS
DIASTOLIC BLOOD PRESSURE: 71 MMHG | RESPIRATION RATE: 17 BRPM | SYSTOLIC BLOOD PRESSURE: 146 MMHG | TEMPERATURE: 98 F | OXYGEN SATURATION: 100 % | HEART RATE: 78 BPM

## 2022-02-07 VITALS
HEART RATE: 74 BPM | DIASTOLIC BLOOD PRESSURE: 82 MMHG | SYSTOLIC BLOOD PRESSURE: 128 MMHG | RESPIRATION RATE: 15 BRPM | OXYGEN SATURATION: 99 %

## 2022-02-07 DIAGNOSIS — K83.9 DISEASE OF BILIARY TRACT, UNSPECIFIED: ICD-10-CM

## 2022-02-07 DIAGNOSIS — Z90.711 ACQUIRED ABSENCE OF UTERUS WITH REMAINING CERVICAL STUMP: Chronic | ICD-10-CM

## 2022-02-07 PROCEDURE — 43259 EGD US EXAM DUODENUM/JEJUNUM: CPT

## 2022-02-07 PROCEDURE — 43239 EGD BIOPSY SINGLE/MULTIPLE: CPT | Mod: 59

## 2022-02-07 PROCEDURE — 88305 TISSUE EXAM BY PATHOLOGIST: CPT | Mod: 26

## 2022-02-08 LAB — SURGICAL PATHOLOGY STUDY: SIGNIFICANT CHANGE UP

## 2022-04-04 PROBLEM — D24.1 INTRADUCTAL PAPILLOMA OF RIGHT BREAST: Status: ACTIVE | Noted: 2020-01-05

## 2022-04-07 ENCOUNTER — APPOINTMENT (OUTPATIENT)
Dept: SURGICAL ONCOLOGY | Facility: CLINIC | Age: 57
End: 2022-04-07
Payer: COMMERCIAL

## 2022-04-07 VITALS
DIASTOLIC BLOOD PRESSURE: 86 MMHG | WEIGHT: 165 LBS | HEIGHT: 67 IN | SYSTOLIC BLOOD PRESSURE: 147 MMHG | HEART RATE: 76 BPM | BODY MASS INDEX: 25.9 KG/M2 | TEMPERATURE: 97.6 F | RESPIRATION RATE: 16 BRPM | OXYGEN SATURATION: 95 %

## 2022-04-07 DIAGNOSIS — D24.1 BENIGN NEOPLASM OF RIGHT BREAST: ICD-10-CM

## 2022-04-07 PROCEDURE — 99214 OFFICE O/P EST MOD 30 MIN: CPT

## 2022-04-07 NOTE — ASSESSMENT
[FreeTextEntry1] : IMP: \par DCIS -MONICA\par Mammo/sono BIRADS 2 2/22\par \par Plan:\par RTO q 6 months\par Bilateral mammogram/sono 2/23

## 2022-04-07 NOTE — PHYSICAL EXAM
[Normal] : supple, no neck mass and thyroid not enlarged [Normal Neck Lymph Nodes] : normal neck lymph nodes  [Normal Supraclavicular Lymph Nodes] : normal supraclavicular lymph nodes [Normal Axillary Lymph Nodes] : normal axillary lymph nodes [Normal] : oriented to person, place and time, with appropriate affect [de-identified] : Fibrocystic changes but no masses

## 2022-04-07 NOTE — HISTORY OF PRESENT ILLNESS
[de-identified] : Rosanna Medina is a 56 year-old female returns for follow up.  She is status post right breast QAMAR  lumpectomy x 2 sites on 1/15/20 for intraductal papilloma and  DCIS.  Pathology demonstrated no residual DCIS with focal LCIS, negative margins.  She completed a right breast mammogram in July 2020 with benign findings (BIRADS 2). \par \par Bilateral Mammo 12/16/20: Postoperative and postradiation changes involving the right breast. This examination is incomplete without the performance of a right breast ultrasound given the clinical history of right upper outer breast pain. Ultrasound is also advised given the recent history of right breast DCIS, LCIS, and radial scar. The patient should return as soon as possible for a right breast ultrasound. The patient is advised to retrieve her prior outside imaging studies performed in July 2020 and submit them for comparison. BI-RADS 0. \par \par Right Sono 12/16/20: No ultrasonographic evidence of malignancy. Return to screening mammography and sonography in one year. Any further evaluation of the patient's symptomatology should be made based on clinical grounds, BI-RADS 2. \par \par B/L mammo/sono 2/14/22: No evidence of malignancy. BIRADS 2 \par \par Today, she states she has intermittent right breast pain. She denies constitutional symptoms otherwise. She is currently going through menopause.

## 2022-04-09 ENCOUNTER — APPOINTMENT (OUTPATIENT)
Dept: ORTHOPEDIC SURGERY | Facility: CLINIC | Age: 57
End: 2022-04-09
Payer: COMMERCIAL

## 2022-04-09 ENCOUNTER — NON-APPOINTMENT (OUTPATIENT)
Age: 57
End: 2022-04-09

## 2022-04-09 VITALS — WEIGHT: 165 LBS | BODY MASS INDEX: 25.9 KG/M2 | HEIGHT: 67 IN

## 2022-04-09 DIAGNOSIS — M25.561 PAIN IN RIGHT KNEE: ICD-10-CM

## 2022-04-09 DIAGNOSIS — S83.91XA SPRAIN OF UNSPECIFIED SITE OF RIGHT KNEE, INITIAL ENCOUNTER: ICD-10-CM

## 2022-04-09 PROCEDURE — 73562 X-RAY EXAM OF KNEE 3: CPT | Mod: RT

## 2022-04-09 PROCEDURE — 99203 OFFICE O/P NEW LOW 30 MIN: CPT

## 2022-04-09 RX ORDER — AMOXICILLIN 500 MG/1
500 CAPSULE ORAL
Qty: 21 | Refills: 0 | Status: ACTIVE | COMMUNITY
Start: 2022-03-02

## 2022-04-09 RX ORDER — DICLOFENAC SODIUM 50 MG/1
50 TABLET, DELAYED RELEASE ORAL
Qty: 60 | Refills: 0 | Status: ACTIVE | COMMUNITY
Start: 2022-04-09 | End: 1900-01-01

## 2022-04-09 RX ORDER — CLOBETASOL PROPIONATE 0.5 MG/G
0.05 OINTMENT TOPICAL
Qty: 60 | Refills: 0 | Status: ACTIVE | COMMUNITY
Start: 2021-12-26

## 2022-04-09 RX ORDER — CHLORHEXIDINE GLUCONATE, 0.12% ORAL RINSE 1.2 MG/ML
0.12 SOLUTION DENTAL
Qty: 473 | Refills: 0 | Status: ACTIVE | COMMUNITY
Start: 2022-03-02

## 2022-04-09 NOTE — DISCUSSION/SUMMARY
[de-identified] : Discussed findings of today's exam and possible causes of patient's pain.  Educated patient on their most probable diagnosis of right knee sprain.  Reviewed possible courses of treatment, and we collaboratively decided best course of treatment at this time will include conservative measures. Patient was sent a course of Diclofenac which she was advised to take with food for 1-2 weeks consistently and then as needed thereafter. Patient was advised to start a course of physical therapy so take advantage of physical modalities to decrease pain as well as gait/posture/mechanics training for increased ROM and strength. Patient was advised that she can use the compression sleeve she purchased as needed. Patient will follow up if pain persists or worsens. She understands and agrees with the plan. \par \par

## 2022-04-09 NOTE — HISTORY OF PRESENT ILLNESS
[de-identified] : Patient is here for right knee pain that began about a week ago without inciting injury. She believes it may be from stretches and exercises. She has pain after biking, when down down stairs and when turning over in bed. She has used Tylenol once to treat it. She works a desk job. \par \par The patient's past medical history, past surgical history, medications and allergies were reviewed by me today and documented accordingly. In addition, the patient's family and social history, which were noncontributory to this visit, were reviewed also. Intake form was reviewed. The patient has no family history of arthritis.

## 2022-04-09 NOTE — PHYSICAL EXAM
[de-identified] : Constitutional: Well-nourished, well-developed, No acute distress\par Respiratory:  Good respiratory effort, no SOB\par Psychiatric: Pleasant and normal affect\par Skin: Clean dry and intact B/L LE\par Musculoskeletal: normal except where as noted in regional exam\par \par  \par \par Left Knee:\par APPEARANCE: no marked deformities, no swelling or malalignment\par POSITIVE TENDERNESS:  + crepitus of the anterior knee\par NONTENDER: jt lines b/l, patellar & quadriceps tendons, MCL/LCL, ITB at the lateral femoral condyle & Gerdy's tubercle, pes bursa.\par ROM: full & painless\par RESISTIVE TESTING:  painless knee flexion and extension.\par SPECIAL TESTS: stable v/v stress. painless grind. neg Lachman's. neg ant/post drawer. neg Soumya's.\par \par  \par \par Right Knee:\par APPEARANCE: no marked deformities, no swelling or malalignment\par POSITIVE TENDERNESS:  + crepitus of the anterior knee, and tenderness of patellar retinaculum\par NONTENDER: jt lines b/l, patellar & quadriceps tendons, MCL/LCL, ITB at the lateral femoral condyle & Gerdy's tubercle, pes bursa.\par ROM: full & painless, although some discomfort in deep knee flexion\par RESISTIVE TESTING: + discomfort with knee ext from deep knee flexion (stretched position), painless knee flexion.\par SPECIAL TESTS: stable v/v stress. painless grind. neg Lachman's. neg ant/post drawer. neg Soumya's.  [de-identified] : The following radiographs were ordered and read by me during this patient's visit. I reviewed each radiograph in detail with the patient and discussed the findings as highlighted below. \par \par 3 views of the right knee were obtained today that show no fracture, or dislocation. There are no degenerative changes seen. There is no malalignment. No obvious osseous abnormality. Otherwise unremarkable.\par

## 2022-04-13 PROBLEM — M25.561 RIGHT KNEE PAIN: Status: ACTIVE | Noted: 2022-04-09

## 2022-04-15 ENCOUNTER — APPOINTMENT (OUTPATIENT)
Dept: ORTHOPEDIC SURGERY | Facility: CLINIC | Age: 57
End: 2022-04-15

## 2022-10-03 ENCOUNTER — APPOINTMENT (OUTPATIENT)
Dept: SURGICAL ONCOLOGY | Facility: CLINIC | Age: 57
End: 2022-10-03

## 2022-10-03 VITALS
OXYGEN SATURATION: 95 % | HEIGHT: 67 IN | RESPIRATION RATE: 16 BRPM | SYSTOLIC BLOOD PRESSURE: 144 MMHG | WEIGHT: 166 LBS | BODY MASS INDEX: 26.06 KG/M2 | HEART RATE: 72 BPM | DIASTOLIC BLOOD PRESSURE: 83 MMHG | TEMPERATURE: 97.6 F

## 2022-10-03 PROCEDURE — 99214 OFFICE O/P EST MOD 30 MIN: CPT

## 2022-10-03 NOTE — ASSESSMENT
[FreeTextEntry1] : IMP: \par DCIS s/p right breast lumpectomy 1/2020, MONICA\par \par Mammo/sono 2/2022- BIRADS 2 \par \par Plan:\par RTO Q6 months\par Bilateral mammogram/sono 2/2023

## 2022-10-03 NOTE — HISTORY OF PRESENT ILLNESS
[de-identified] : Rosanna Medina is a 57 year-old female returns for follow up.  \par \par She is status post right breast QAMAR  lumpectomy x 2 sites on 1/15/20 for intraductal papilloma and DCIS. \par Pathology demonstrated no residual DCIS with focal LCIS, negative margins.  \par \par B/L mammo/sono 2/14/22: No evidence of malignancy. BI-RADS 2 \par \par Today, she states she has intermittent right breast pain. She denies constitutional symptoms otherwise. She is currently going through menopause (started April 2020, still ongoing)

## 2022-10-03 NOTE — PHYSICAL EXAM
[Normal] : supple, no neck mass and thyroid not enlarged [Normal Neck Lymph Nodes] : normal neck lymph nodes  [Normal Supraclavicular Lymph Nodes] : normal supraclavicular lymph nodes [Normal Axillary Lymph Nodes] : normal axillary lymph nodes [Normal] : oriented to person, place and time, with appropriate affect [de-identified] : Fibrocystic changes but no masses

## 2023-02-27 ENCOUNTER — OUTPATIENT (OUTPATIENT)
Dept: OUTPATIENT SERVICES | Facility: HOSPITAL | Age: 58
LOS: 1 days | End: 2023-02-27
Payer: COMMERCIAL

## 2023-02-27 ENCOUNTER — APPOINTMENT (OUTPATIENT)
Dept: MAMMOGRAPHY | Facility: IMAGING CENTER | Age: 58
End: 2023-02-27
Payer: COMMERCIAL

## 2023-02-27 ENCOUNTER — RESULT REVIEW (OUTPATIENT)
Age: 58
End: 2023-02-27

## 2023-02-27 ENCOUNTER — APPOINTMENT (OUTPATIENT)
Dept: ULTRASOUND IMAGING | Facility: IMAGING CENTER | Age: 58
End: 2023-02-27
Payer: COMMERCIAL

## 2023-02-27 DIAGNOSIS — Z90.711 ACQUIRED ABSENCE OF UTERUS WITH REMAINING CERVICAL STUMP: Chronic | ICD-10-CM

## 2023-02-27 DIAGNOSIS — D05.10 INTRADUCTAL CARCINOMA IN SITU OF UNSPECIFIED BREAST: ICD-10-CM

## 2023-02-27 PROCEDURE — 76641 ULTRASOUND BREAST COMPLETE: CPT

## 2023-02-27 PROCEDURE — 77066 DX MAMMO INCL CAD BI: CPT | Mod: 26

## 2023-02-27 PROCEDURE — 76641 ULTRASOUND BREAST COMPLETE: CPT | Mod: 26,50

## 2023-02-27 PROCEDURE — 77066 DX MAMMO INCL CAD BI: CPT

## 2023-02-27 PROCEDURE — G0279: CPT | Mod: 26

## 2023-02-27 PROCEDURE — G0279: CPT

## 2024-01-15 ENCOUNTER — RESULT REVIEW (OUTPATIENT)
Age: 59
End: 2024-01-15

## 2024-01-15 ENCOUNTER — APPOINTMENT (OUTPATIENT)
Dept: SURGICAL ONCOLOGY | Facility: CLINIC | Age: 59
End: 2024-01-15
Payer: COMMERCIAL

## 2024-01-15 VITALS
BODY MASS INDEX: 26.06 KG/M2 | WEIGHT: 166 LBS | HEIGHT: 67 IN | DIASTOLIC BLOOD PRESSURE: 80 MMHG | SYSTOLIC BLOOD PRESSURE: 132 MMHG | OXYGEN SATURATION: 99 % | HEART RATE: 80 BPM

## 2024-01-15 DIAGNOSIS — D05.10 INTRADUCTAL CARCINOMA IN SITU OF UNSPECIFIED BREAST: ICD-10-CM

## 2024-01-15 PROCEDURE — 99214 OFFICE O/P EST MOD 30 MIN: CPT

## 2024-01-15 NOTE — ASSESSMENT
[FreeTextEntry1] : IMP: 59yo F w/ hx of R DCIS 1/2020  no need for adjuvant or RT given the small size   B/L mammo/sono 2/2023 - BIRADS 2  PLAN: RTO yearly  mammo/sono 2/2024

## 2024-01-15 NOTE — PHYSICAL EXAM
[Normal] : supple, no neck mass and thyroid not enlarged [Normal Neck Lymph Nodes] : normal neck lymph nodes  [Normal Supraclavicular Lymph Nodes] : normal supraclavicular lymph nodes [Normal Axillary Lymph Nodes] : normal axillary lymph nodes [Normal] : oriented to person, place and time, with appropriate affect [de-identified] : Fibrocystic changes but no masses

## 2024-01-15 NOTE — HISTORY OF PRESENT ILLNESS
[de-identified] : Rosanna Medina is a 58 year-old female returns for follow up.    She is status post right breast QAMAR  lumpectomy x 2 sites on 1/15/20 for intraductal papilloma and DCIS.  Pathology demonstrated no residual DCIS with focal LCIS, negative margins.    B/L mammo/sono 2/2023 - BIRADS 2  She denies palpable breast masses, nipple discharge, skin changes, inversion or breast pain.

## 2024-03-04 ENCOUNTER — RESULT REVIEW (OUTPATIENT)
Age: 59
End: 2024-03-04

## 2024-03-04 ENCOUNTER — APPOINTMENT (OUTPATIENT)
Dept: MAMMOGRAPHY | Facility: IMAGING CENTER | Age: 59
End: 2024-03-04
Payer: COMMERCIAL

## 2024-03-04 ENCOUNTER — OUTPATIENT (OUTPATIENT)
Dept: OUTPATIENT SERVICES | Facility: HOSPITAL | Age: 59
LOS: 1 days | End: 2024-03-04
Payer: COMMERCIAL

## 2024-03-04 ENCOUNTER — APPOINTMENT (OUTPATIENT)
Dept: ULTRASOUND IMAGING | Facility: IMAGING CENTER | Age: 59
End: 2024-03-04
Payer: COMMERCIAL

## 2024-03-04 DIAGNOSIS — Z00.8 ENCOUNTER FOR OTHER GENERAL EXAMINATION: ICD-10-CM

## 2024-03-04 DIAGNOSIS — Z90.711 ACQUIRED ABSENCE OF UTERUS WITH REMAINING CERVICAL STUMP: Chronic | ICD-10-CM

## 2024-03-04 PROCEDURE — 76641 ULTRASOUND BREAST COMPLETE: CPT | Mod: 26,50

## 2024-03-04 PROCEDURE — 77063 BREAST TOMOSYNTHESIS BI: CPT

## 2024-03-04 PROCEDURE — G0279: CPT | Mod: 26

## 2024-03-04 PROCEDURE — 77066 DX MAMMO INCL CAD BI: CPT

## 2024-03-04 PROCEDURE — 77067 SCR MAMMO BI INCL CAD: CPT

## 2024-03-04 PROCEDURE — 76641 ULTRASOUND BREAST COMPLETE: CPT

## 2024-03-04 PROCEDURE — 77066 DX MAMMO INCL CAD BI: CPT | Mod: 26

## 2024-03-04 PROCEDURE — G0279: CPT

## 2025-01-10 ENCOUNTER — APPOINTMENT (OUTPATIENT)
Dept: SURGICAL ONCOLOGY | Facility: CLINIC | Age: 60
End: 2025-01-10
Payer: COMMERCIAL

## 2025-01-10 ENCOUNTER — NON-APPOINTMENT (OUTPATIENT)
Age: 60
End: 2025-01-10

## 2025-01-10 VITALS
DIASTOLIC BLOOD PRESSURE: 80 MMHG | WEIGHT: 168 LBS | HEIGHT: 67 IN | SYSTOLIC BLOOD PRESSURE: 143 MMHG | RESPIRATION RATE: 17 BRPM | HEART RATE: 76 BPM | OXYGEN SATURATION: 99 % | BODY MASS INDEX: 26.37 KG/M2

## 2025-01-10 DIAGNOSIS — D05.10 INTRADUCTAL CARCINOMA IN SITU OF UNSPECIFIED BREAST: ICD-10-CM

## 2025-01-10 PROCEDURE — 99214 OFFICE O/P EST MOD 30 MIN: CPT

## 2025-02-10 ENCOUNTER — APPOINTMENT (OUTPATIENT)
Dept: ORTHOPEDIC SURGERY | Facility: CLINIC | Age: 60
End: 2025-02-10

## 2025-02-10 VITALS
OXYGEN SATURATION: 98 % | HEIGHT: 67 IN | WEIGHT: 168 LBS | HEART RATE: 72 BPM | SYSTOLIC BLOOD PRESSURE: 146 MMHG | DIASTOLIC BLOOD PRESSURE: 80 MMHG | BODY MASS INDEX: 26.37 KG/M2

## 2025-02-10 DIAGNOSIS — M70.50 OTHER BURSITIS OF KNEE, UNSPECIFIED KNEE: ICD-10-CM

## 2025-02-10 DIAGNOSIS — S89.92XA UNSPECIFIED INJURY OF LEFT LOWER LEG, INITIAL ENCOUNTER: ICD-10-CM

## 2025-02-10 PROCEDURE — 99203 OFFICE O/P NEW LOW 30 MIN: CPT | Mod: 25

## 2025-02-10 PROCEDURE — 73564 X-RAY EXAM KNEE 4 OR MORE: CPT | Mod: LT

## 2025-02-10 PROCEDURE — 20611 DRAIN/INJ JOINT/BURSA W/US: CPT | Mod: LT

## 2025-03-10 ENCOUNTER — APPOINTMENT (OUTPATIENT)
Dept: MAMMOGRAPHY | Facility: IMAGING CENTER | Age: 60
End: 2025-03-10
Payer: COMMERCIAL

## 2025-03-10 ENCOUNTER — RESULT REVIEW (OUTPATIENT)
Age: 60
End: 2025-03-10

## 2025-03-10 ENCOUNTER — OUTPATIENT (OUTPATIENT)
Dept: OUTPATIENT SERVICES | Facility: HOSPITAL | Age: 60
LOS: 1 days | End: 2025-03-10
Payer: COMMERCIAL

## 2025-03-10 ENCOUNTER — APPOINTMENT (OUTPATIENT)
Dept: ULTRASOUND IMAGING | Facility: IMAGING CENTER | Age: 60
End: 2025-03-10
Payer: COMMERCIAL

## 2025-03-10 DIAGNOSIS — D05.10 INTRADUCTAL CARCINOMA IN SITU OF UNSPECIFIED BREAST: ICD-10-CM

## 2025-03-10 DIAGNOSIS — Z00.8 ENCOUNTER FOR OTHER GENERAL EXAMINATION: ICD-10-CM

## 2025-03-10 DIAGNOSIS — Z90.711 ACQUIRED ABSENCE OF UTERUS WITH REMAINING CERVICAL STUMP: Chronic | ICD-10-CM

## 2025-03-10 PROCEDURE — 76641 ULTRASOUND BREAST COMPLETE: CPT

## 2025-03-10 PROCEDURE — 77063 BREAST TOMOSYNTHESIS BI: CPT | Mod: 26

## 2025-03-10 PROCEDURE — 76641 ULTRASOUND BREAST COMPLETE: CPT | Mod: 26,50

## 2025-03-10 PROCEDURE — 77067 SCR MAMMO BI INCL CAD: CPT | Mod: 26

## 2025-03-10 PROCEDURE — 77067 SCR MAMMO BI INCL CAD: CPT

## 2025-03-10 PROCEDURE — 77063 BREAST TOMOSYNTHESIS BI: CPT

## 2025-05-23 ENCOUNTER — OUTPATIENT (OUTPATIENT)
Dept: OUTPATIENT SERVICES | Facility: HOSPITAL | Age: 60
LOS: 1 days | End: 2025-05-23

## 2025-05-23 VITALS
TEMPERATURE: 99 F | SYSTOLIC BLOOD PRESSURE: 135 MMHG | OXYGEN SATURATION: 100 % | WEIGHT: 166.01 LBS | DIASTOLIC BLOOD PRESSURE: 85 MMHG | HEIGHT: 67 IN | RESPIRATION RATE: 16 BRPM | HEART RATE: 70 BPM

## 2025-05-23 DIAGNOSIS — M20.22 HALLUX RIGIDUS, LEFT FOOT: ICD-10-CM

## 2025-05-23 DIAGNOSIS — Z90.711 ACQUIRED ABSENCE OF UTERUS WITH REMAINING CERVICAL STUMP: Chronic | ICD-10-CM

## 2025-05-23 DIAGNOSIS — N81.10 CYSTOCELE, UNSPECIFIED: Chronic | ICD-10-CM

## 2025-05-23 DIAGNOSIS — Z98.890 OTHER SPECIFIED POSTPROCEDURAL STATES: Chronic | ICD-10-CM

## 2025-05-23 NOTE — H&P PST ADULT - PROBLEM SELECTOR PLAN 1
Scheduled for Cheilectomy left big toe on 5/29/25.  Pre-op instructions provided. Pt given verbal and written instructions with teach back on chlorhexidine shampoo and pepcid. Pt verbalized understanding with return demonstration.

## 2025-05-23 NOTE — H&P PST ADULT - NSICDXPASTMEDICALHX_GEN_ALL_CORE_FT
PAST MEDICAL HISTORY:  DCIS (ductal carcinoma in situ) of breast Right    Endometriosis     Female bladder prolapse     Fibroids     Hallux rigidus, left foot

## 2025-05-23 NOTE — H&P PST ADULT - NSICDXPASTSURGICALHX_GEN_ALL_CORE_FT
PAST SURGICAL HISTORY:  Female bladder prolapse     History of reduction surgery of right breast     S/P partial hysterectomy 2014

## 2025-05-29 ENCOUNTER — RESULT REVIEW (OUTPATIENT)
Age: 60
End: 2025-05-29

## 2025-05-29 ENCOUNTER — TRANSCRIPTION ENCOUNTER (OUTPATIENT)
Age: 60
End: 2025-05-29

## 2025-05-29 ENCOUNTER — OUTPATIENT (OUTPATIENT)
Dept: OUTPATIENT SERVICES | Facility: HOSPITAL | Age: 60
LOS: 1 days | End: 2025-05-29
Payer: COMMERCIAL

## 2025-05-29 VITALS
OXYGEN SATURATION: 100 % | SYSTOLIC BLOOD PRESSURE: 142 MMHG | DIASTOLIC BLOOD PRESSURE: 69 MMHG | RESPIRATION RATE: 12 BRPM | HEART RATE: 64 BPM

## 2025-05-29 VITALS
RESPIRATION RATE: 20 BRPM | HEART RATE: 70 BPM | WEIGHT: 166.01 LBS | DIASTOLIC BLOOD PRESSURE: 83 MMHG | HEIGHT: 67 IN | TEMPERATURE: 98 F | OXYGEN SATURATION: 99 % | SYSTOLIC BLOOD PRESSURE: 148 MMHG

## 2025-05-29 DIAGNOSIS — M20.22 HALLUX RIGIDUS, LEFT FOOT: ICD-10-CM

## 2025-05-29 DIAGNOSIS — Z90.711 ACQUIRED ABSENCE OF UTERUS WITH REMAINING CERVICAL STUMP: Chronic | ICD-10-CM

## 2025-05-29 DIAGNOSIS — N81.10 CYSTOCELE, UNSPECIFIED: Chronic | ICD-10-CM

## 2025-05-29 DIAGNOSIS — Z98.890 OTHER SPECIFIED POSTPROCEDURAL STATES: Chronic | ICD-10-CM

## 2025-05-29 PROCEDURE — 73630 X-RAY EXAM OF FOOT: CPT | Mod: 26,LT

## 2025-05-29 NOTE — ASU DISCHARGE PLAN (ADULT/PEDIATRIC) - FINANCIAL ASSISTANCE
[facility] provides services at a reduced cost to those who are determined to be eligible through [facility]’s financial assistance program. Information regarding [facility]’s financial assistance program can be found by going to [email or web address] or by calling [phone].
[place]

## 2025-05-29 NOTE — ASU DISCHARGE PLAN (ADULT/PEDIATRIC) - NS MD DC FALL RISK RISK
For information on Fall & Injury Prevention, visit: [email or web address] OR  [email or web address] OR  https://www.cdc.gov/steadi/patient.html 3 = A little assistance

## 2025-05-29 NOTE — ASU PREOPERATIVE ASSESSMENT, ADULT (IPARK ONLY) - TEACHING/LEARNING FACTORS INFLUENCE READINESS TO LEARN
none
What Type Of Note Output Would You Prefer (Optional)?: Standard Output
Hpi Title: Evaluation of Skin Lesions

## 2025-05-29 NOTE — ASU DISCHARGE PLAN (ADULT/PEDIATRIC) - CARE PROVIDER_API CALL
Lisa Montgomery  Podiatric Medicine and Surgery  [address], [address]  [place] [address]  Phone: [phone]  Fax: [phone]  Follow Up Time:

## (undated) DEVICE — ELCTR ROCKER SWITCH PENCIL BLUE 10FT

## (undated) DEVICE — LUBRICATING JELLY HR ONE SHOT 3G

## (undated) DEVICE — CONTAINER FORMALIN 10% 20ML

## (undated) DEVICE — BASIN EMESIS 10IN GRADUATED MAUVE

## (undated) DEVICE — DRSG KLING 4"

## (undated) DEVICE — FRAZIER SUCTION TIP 8FR

## (undated) DEVICE — LABELS BLANK W PEN

## (undated) DEVICE — TUBING MEDI-VAC W MAXIGRIP CONNECTORS 1/4"X6'

## (undated) DEVICE — DRSG BANDAID 0.75X3"

## (undated) DEVICE — DENTURE CUP PINK

## (undated) DEVICE — VENODYNE/SCD SLEEVE CALF MEDIUM

## (undated) DEVICE — DRSG STOCKINETTE TUBULAR 6"

## (undated) DEVICE — PACK LIJ BASIC ORTHO

## (undated) DEVICE — WARMING BLANKET UPPER ADULT

## (undated) DEVICE — CONTAINER FORMALIN 80ML YELLOW

## (undated) DEVICE — DRSG XEROFORM 1 X 8"

## (undated) DEVICE — ELCTR ECG CONDUCTIVE ADHESIVE

## (undated) DEVICE — BLADE SURGICAL #15 CARBON

## (undated) DEVICE — NDL HYPO REGULAR BEVEL 25G X 1.5" (BLUE)

## (undated) DEVICE — SHOE POST OP NYLON FEMALE LG

## (undated) DEVICE — POSITIONER FOAM EGG CRATE ULNAR 2PCS (PINK)

## (undated) DEVICE — SALIVA EJECTOR (BLUE)

## (undated) DEVICE — NDL HYPO SAFE 18G X 1.5" (PINK)

## (undated) DEVICE — SOL IRR POUR H2O 500ML

## (undated) DEVICE — GOWN LG

## (undated) DEVICE — BUR LINVATEC OVAL MEDIUM 4MM CARBIDE

## (undated) DEVICE — SUT NYLON 3-0 18" PS-2

## (undated) DEVICE — FACESHIELD FULL VISOR

## (undated) DEVICE — CATH IV SAFE BC 22G X 1" (BLUE)

## (undated) DEVICE — GLV 6 PROTEXIS (WHITE)

## (undated) DEVICE — SAW BLADE MICROAIRE SAGITTAL 9.4MMX25.4MMX0.6MM

## (undated) DEVICE — DRSG CURITY GAUZE SPONGE 4 X 4" 12-PLY NON-STERILE

## (undated) DEVICE — BITE BLOCK ADULT 20 X 27MM (GREEN)

## (undated) DEVICE — DRSG ACE BANDAGE 4" NS

## (undated) DEVICE — SYR LUER LOK 10CC

## (undated) DEVICE — LINE BREATHE SAMPLNG

## (undated) DEVICE — TOURNIQUET ESMARK 4"

## (undated) DEVICE — ELCTR GROUNDING PAD ADULT COVIDIEN

## (undated) DEVICE — DRSG 2X2

## (undated) DEVICE — BIOPSY FORCEP COLD DISP

## (undated) DEVICE — SUT VICRYL 3-0 18" PS-2 UNDYED

## (undated) DEVICE — GLV 6.5 PROTEXIS (WHITE)

## (undated) DEVICE — BIOPSY FORCEP RADIAL JAW 4 STANDARD WITH NEEDLE

## (undated) DEVICE — POSITIONER PATIENT SAFETY STRAP 3X60"

## (undated) DEVICE — SOL IRR POUR NS 0.9% 500ML

## (undated) DEVICE — TUBING IV SET GRAVITY 3Y 100" MACRO

## (undated) DEVICE — PREP CHLORAPREP HI-LITE ORANGE 26ML

## (undated) DEVICE — CLAMP BX HOT RAD JAW 3

## (undated) DEVICE — DRAPE C ARM MINI

## (undated) DEVICE — UNDERPAD LINEN SAVER 17 X 24"

## (undated) DEVICE — DRSG CURITY GAUZE SPONGE 4 X 4" 12-PLY

## (undated) DEVICE — PREP BETADINE KIT

## (undated) DEVICE — SAW BLADE LINVATEC SAGITTAL MIC 9.5X25.5X0.4MM

## (undated) DEVICE — PACK IV START WITH CHG